# Patient Record
Sex: MALE | Race: WHITE | NOT HISPANIC OR LATINO | Employment: OTHER | URBAN - METROPOLITAN AREA
[De-identification: names, ages, dates, MRNs, and addresses within clinical notes are randomized per-mention and may not be internally consistent; named-entity substitution may affect disease eponyms.]

---

## 2022-03-10 ENCOUNTER — HISTORICAL (OUTPATIENT)
Dept: LAB | Facility: HOSPITAL | Age: 57
End: 2022-03-10

## 2022-03-10 LAB
ABS NEUT (OLG): 3.6
ALBUMIN SERPL-MCNC: 3.8 G/DL (ref 3.5–5)
ALBUMIN/GLOB SERPL: 1.1 {RATIO} (ref 1.1–2)
ALP SERPL-CCNC: 88 U/L (ref 40–150)
ALT SERPL-CCNC: 12 U/L (ref 0–55)
APPEARANCE, UA: CLEAR
AST SERPL-CCNC: 12 U/L (ref 5–34)
BACTERIA SPEC CULT: NORMAL
BASOPHILS # BLD AUTO: 0.04 10*3/UL
BASOPHILS NFR BLD AUTO: 0.5 %
BILIRUB SERPL-MCNC: 0.7 MG/DL
BILIRUB UR QL STRIP: NEGATIVE
BILIRUBIN DIRECT+TOT PNL SERPL-MCNC: 0.3 (ref 0–0.5)
BILIRUBIN DIRECT+TOT PNL SERPL-MCNC: 0.4
BUN SERPL-MCNC: 13 MG/DL (ref 8.4–25.7)
CALCIUM SERPL-MCNC: 9 MG/DL (ref 8.7–10.5)
CHLORIDE SERPL-SCNC: 104 MMOL/L (ref 98–107)
CO2 SERPL-SCNC: 28 MMOL/L (ref 22–29)
COLOR UR: YELLOW
CREAT SERPL-MCNC: 0.94 MG/DL (ref 0.73–1.18)
EOSINOPHIL # BLD AUTO: 0.08 10*3/UL
EOSINOPHIL NFR BLD AUTO: 1.1 %
ERYTHROCYTE [DISTWIDTH] IN BLOOD BY AUTOMATED COUNT: 14 %
GLOBULIN SER-MCNC: 3.5 G/DL (ref 2.4–3.5)
GLUCOSE (UA): NEGATIVE
GLUCOSE SERPL-MCNC: 111 MG/DL (ref 74–100)
HCT VFR BLD AUTO: 45.6 % (ref 39–49)
HEMOLYSIS INTERF INDEX SERPL-ACNC: 7
HGB BLD-MCNC: 15.4 G/DL (ref 12.6–16.6)
HGB UR QL STRIP: NEGATIVE
ICTERIC INTERF INDEX SERPL-ACNC: 1
IMM GRANULOCYTES # BLD AUTO: 0.02 10*3/UL (ref 0–0.1)
IMM GRANULOCYTES NFR BLD AUTO: 0.3 % (ref 0–1)
KETONES UR QL STRIP: NEGATIVE
LEUKOCYTE ESTERASE UR QL STRIP: NEGATIVE
LIPEMIC INTERF INDEX SERPL-ACNC: 0
LYMPHOCYTES # BLD AUTO: 3.04 10*3/UL
LYMPHOCYTES NFR BLD AUTO: 40.7 %
MANUAL DIFF? (OHS): NO
MCH RBC QN AUTO: 29.8 PG (ref 27–33)
MCHC RBC AUTO-ENTMCNC: 33.8 G/DL (ref 32–35)
MCV RBC AUTO: 88.4 FL (ref 84–97)
MONOCYTES # BLD AUTO: 0.69 10*3/UL
MONOCYTES NFR BLD AUTO: 9.2 %
NEUTROPHILS # BLD AUTO: 3.6 10*3/UL
NEUTROPHILS NFR BLD AUTO: 48.2 %
NITRITE UR QL STRIP: NEGATIVE
PH UR STRIP: 6.5 [PH] (ref 4.6–8)
PLATELET # BLD AUTO: 314 10*3/UL (ref 140–450)
PMV BLD AUTO: 9 FL
POTASSIUM SERPL-SCNC: 4.2 MMOL/L (ref 3.5–5.1)
PROT SERPL-MCNC: 7.3 G/DL (ref 6.4–8.3)
PROT UR QL STRIP: NEGATIVE
PSA SERPL-MCNC: 0.35 NG/ML
RBC # BLD AUTO: 5.16 10*6/UL (ref 4.3–5.6)
RBC #/AREA URNS HPF: NORMAL /[HPF]
SODIUM SERPL-SCNC: 141 MMOL/L (ref 136–145)
SP GR UR STRIP: <=1.005 (ref 1–1.03)
SQUAMOUS EPITHELIAL, UA: NORMAL
TSH SERPL-ACNC: 0.6 M[IU]/L (ref 0.35–4.94)
UROBILINOGEN UR STRIP-ACNC: 0.2
WBC # SPEC AUTO: 7.47 10*3/UL (ref 3.4–9.2)
WBC #/AREA URNS HPF: NORMAL /[HPF] (ref 0–3)

## 2022-04-28 ENCOUNTER — HISTORICAL (OUTPATIENT)
Dept: LAB | Facility: HOSPITAL | Age: 57
End: 2022-04-28
Payer: MEDICAID

## 2022-04-28 LAB
BUN SERPL-MCNC: 7 MG/DL (ref 8.4–25.7)
CALCIUM SERPL-MCNC: 9 MG/DL (ref 8.7–10.5)
CHLORIDE SERPL-SCNC: 105 MMOL/L (ref 98–107)
CO2 SERPL-SCNC: 27 MMOL/L (ref 22–29)
CREAT SERPL-MCNC: 1.01 MG/DL (ref 0.73–1.18)
CREAT/UREA NIT SERPL: 7
GLUCOSE SERPL-MCNC: 127 MG/DL (ref 74–100)
HEMOLYSIS INTERF INDEX SERPL-ACNC: 9
ICTERIC INTERF INDEX SERPL-ACNC: 1
LIPEMIC INTERF INDEX SERPL-ACNC: 5
POTASSIUM SERPL-SCNC: 4.4 MMOL/L (ref 3.5–5.1)
SODIUM SERPL-SCNC: 142 MMOL/L (ref 136–145)

## 2022-04-29 ENCOUNTER — HISTORICAL (OUTPATIENT)
Dept: RADIOLOGY | Facility: HOSPITAL | Age: 57
End: 2022-04-29
Payer: MEDICAID

## 2022-08-16 NOTE — PROGRESS NOTES
Patient ID: [id number]     Chief Complaint: Follow-up (Pt states since cardio (LETICIA Faust, GORDON) put him on amlodipine 5mg he started having severe headaches so he D'C'd all medications. Has not taken any in approx 1 week.)        HPI:     Antonio Thompson is a 56 y.o. male here today with high bp, states he quit taking all BP meds about a week ago which included amlodipine and losartaan  States he has been off of propranolol for months  Reports ongoing headache x weeks on and off  Denies visual changes, slurred speech, extremity weakness, memory loss     States he quit taking meds bc amlodipine caused headache after starting    Not taking anything for headaches  Drinks a lot of dr pepper daily  Denies drug and alcohol use      He is smoking 1/2 ppd now, but used to smoke 1.5 ppd since the age of 14. He does not drink alcohol, but used to.          ----------------------------  Cervical spondylosis  Chronic low back pain  COPD (chronic obstructive pulmonary disease)  HTN (hypertension)  Hyperlipidemia  Migraine  Palpitations     Past Surgical History:   Procedure Laterality Date    MANDIBLE SURGERY  1992       Review of patient's allergies indicates:  No Known Allergies    Outpatient Medications Marked as Taking for the 8/18/22 encounter (Office Visit) with NURSE PRACTITIONER, [facility]   Medication Sig Dispense Refill    albuterol (PROVENTIL/VENTOLIN HFA) 90 mcg/actuation inhaler Inhale into the lungs.         Social History     Socioeconomic History    Marital status:    Tobacco Use    Smoking status: Current Every Day Smoker     Packs/day: 1.00     Types: Cigarettes    Smokeless tobacco: Never Used   Substance and Sexual Activity    Alcohol use: Not Currently    Drug use: Not Currently     Types: Marijuana    Sexual activity: Yes        Family History   Problem Relation Age of Onset    Heart attack Father 54        Cause of death    Coronary artery disease Father     Hypertension Father      "Hyperlipidemia Father     Hypertension Brother         Subjective:     ROS      See HPI for details    Constitutional: Denies Change in appetite. Denies Chills. Denies Fever. Denies Night sweats.  Eye: Denies Blurred vision. Denies Discharge. Denies Eye pain.  ENT: Denies Decreased hearing. Denies Sore throat. Denies Swollen glands.  Respiratory: Denies Cough. Denies Shortness of breath. Denies Shortness of breath with exertion. Denies Wheezing.  Cardiovascular: Denies Chest pain at rest. Denies Chest pain with exertion. Denies Irregular heartbeat. Denies Palpitations.  Neurologic: Denies Dizziness. Denies Fainting. + Headache.      All Other ROS: Negative except as stated in HPI.       Objective:     BP (!) 199/86   Pulse (!) 58   Temp 97.5 °F (36.4 °C) (Tympanic)   Resp 18   Ht 5' 3.78" (1.62 m)   Wt 57.3 kg (126 lb 6.4 oz)   SpO2 99%   BMI 21.85 kg/m²     Physical Exam    General: Alert and oriented, No acute distress.  Head: Normocephalic, Atraumatic.  Eye: Pupils are equal, round and reactive to light, Extraocular movements are intact, Sclera non-icteric.  Neck/Thyroid: Supple,   Respiratory: Clear to auscultation bilaterally; No wheezes, rales or rhonchi,Non-labored respirations, Symmetrical chest wall expansion.  Cardiovascular: Regular rate and rhythm, S1/S2 normal, No murmurs, rubs or gallops.  Musculoskeletal: Normal range of motion.  Integumentary: Warm, Dry, Intact, No suspicious lesions or rashes.  Extremities: No clubbing, cyanosis or edema  Neurologic: No focal deficits, .  Psychiatric: Normal interaction, Appropriate affect         Assessment:       ICD-10-CM ICD-9-CM   1. Uncontrolled hypertension  I10 401.9   2. Nonadherence to medication  Z91.14 V15.81        Plan:     1. Uncontrolled hypertension    2. Nonadherence to medication    Start benicar 40/25, clonidine prn systolic >180  Call with bp tomorrow  F/u next week for revisit                 Follow up in about 1 week (around " 8/25/2022), or with bp log.

## 2022-08-18 ENCOUNTER — OFFICE VISIT (OUTPATIENT)
Dept: FAMILY MEDICINE | Facility: CLINIC | Age: 57
End: 2022-08-18
Payer: MEDICAID

## 2022-08-18 VITALS
RESPIRATION RATE: 18 BRPM | DIASTOLIC BLOOD PRESSURE: 86 MMHG | HEIGHT: 64 IN | HEART RATE: 58 BPM | SYSTOLIC BLOOD PRESSURE: 199 MMHG | WEIGHT: 126.38 LBS | TEMPERATURE: 98 F | OXYGEN SATURATION: 99 % | BODY MASS INDEX: 21.57 KG/M2

## 2022-08-18 DIAGNOSIS — I10 UNCONTROLLED HYPERTENSION: Primary | ICD-10-CM

## 2022-08-18 DIAGNOSIS — Z91.148 NONADHERENCE TO MEDICATION: ICD-10-CM

## 2022-08-18 PROCEDURE — 99214 PR OFFICE/OUTPT VISIT, EST, LEVL IV, 30-39 MIN: ICD-10-PCS | Mod: ,,, | Performed by: NURSE PRACTITIONER

## 2022-08-18 PROCEDURE — 3077F SYST BP >= 140 MM HG: CPT | Mod: CPTII,,, | Performed by: NURSE PRACTITIONER

## 2022-08-18 PROCEDURE — 3079F DIAST BP 80-89 MM HG: CPT | Mod: CPTII,,, | Performed by: NURSE PRACTITIONER

## 2022-08-18 PROCEDURE — 3008F PR BODY MASS INDEX (BMI) DOCUMENTED: ICD-10-PCS | Mod: CPTII,,, | Performed by: NURSE PRACTITIONER

## 2022-08-18 PROCEDURE — 1159F MED LIST DOCD IN RCRD: CPT | Mod: CPTII,,, | Performed by: NURSE PRACTITIONER

## 2022-08-18 PROCEDURE — 99214 OFFICE O/P EST MOD 30 MIN: CPT | Mod: ,,, | Performed by: NURSE PRACTITIONER

## 2022-08-18 PROCEDURE — 4010F ACE/ARB THERAPY RXD/TAKEN: CPT | Mod: CPTII,,, | Performed by: NURSE PRACTITIONER

## 2022-08-18 PROCEDURE — 3079F PR MOST RECENT DIASTOLIC BLOOD PRESSURE 80-89 MM HG: ICD-10-PCS | Mod: CPTII,,, | Performed by: NURSE PRACTITIONER

## 2022-08-18 PROCEDURE — 3077F PR MOST RECENT SYSTOLIC BLOOD PRESSURE >= 140 MM HG: ICD-10-PCS | Mod: CPTII,,, | Performed by: NURSE PRACTITIONER

## 2022-08-18 PROCEDURE — 1159F PR MEDICATION LIST DOCUMENTED IN MEDICAL RECORD: ICD-10-PCS | Mod: CPTII,,, | Performed by: NURSE PRACTITIONER

## 2022-08-18 PROCEDURE — 3008F BODY MASS INDEX DOCD: CPT | Mod: CPTII,,, | Performed by: NURSE PRACTITIONER

## 2022-08-18 PROCEDURE — 4010F PR ACE/ARB THEARPY RXD/TAKEN: ICD-10-PCS | Mod: CPTII,,, | Performed by: NURSE PRACTITIONER

## 2022-08-18 PROCEDURE — 1160F RVW MEDS BY RX/DR IN RCRD: CPT | Mod: CPTII,,, | Performed by: NURSE PRACTITIONER

## 2022-08-18 PROCEDURE — 1160F PR REVIEW ALL MEDS BY PRESCRIBER/CLIN PHARMACIST DOCUMENTED: ICD-10-PCS | Mod: CPTII,,, | Performed by: NURSE PRACTITIONER

## 2022-08-18 RX ORDER — PROPRANOLOL HYDROCHLORIDE 20 MG/1
20 TABLET ORAL 2 TIMES DAILY
COMMUNITY
Start: 2022-05-02 | End: 2022-08-18

## 2022-08-18 RX ORDER — PRAVASTATIN SODIUM 40 MG/1
40 TABLET ORAL DAILY
COMMUNITY
Start: 2022-05-02

## 2022-08-18 RX ORDER — ALBUTEROL SULFATE 90 UG/1
AEROSOL, METERED RESPIRATORY (INHALATION)
COMMUNITY
Start: 2022-04-28

## 2022-08-18 RX ORDER — LOSARTAN POTASSIUM 25 MG/1
25 TABLET ORAL DAILY
COMMUNITY
Start: 2022-05-16 | End: 2022-08-18

## 2022-08-18 RX ORDER — CLONIDINE HYDROCHLORIDE 0.1 MG/1
0.1 TABLET ORAL 3 TIMES DAILY PRN
Qty: 60 TABLET | Refills: 0 | Status: SHIPPED | OUTPATIENT
Start: 2022-08-18 | End: 2023-08-18

## 2022-08-18 RX ORDER — AMLODIPINE BESYLATE 5 MG/1
5 TABLET ORAL DAILY
COMMUNITY
Start: 2022-05-16 | End: 2022-08-18 | Stop reason: SINTOL

## 2022-08-18 RX ORDER — AMITRIPTYLINE HYDROCHLORIDE 25 MG/1
25 TABLET, FILM COATED ORAL NIGHTLY
COMMUNITY
Start: 2022-05-03

## 2022-08-18 RX ORDER — OLMESARTAN MEDOXOMIL AND HYDROCHLOROTHIAZIDE 40/25 40; 25 MG/1; MG/1
1 TABLET ORAL DAILY
Qty: 90 TABLET | Refills: 3 | Status: SHIPPED | OUTPATIENT
Start: 2022-08-18 | End: 2023-08-18

## 2022-09-28 DIAGNOSIS — I10 UNCONTROLLED HYPERTENSION: Primary | ICD-10-CM

## 2022-09-28 DIAGNOSIS — E78.5 HYPERLIPIDEMIA, UNSPECIFIED HYPERLIPIDEMIA TYPE: ICD-10-CM

## 2022-09-28 DIAGNOSIS — R73.01 ELEVATED FASTING GLUCOSE: ICD-10-CM

## 2024-10-21 DIAGNOSIS — R22.1 LOCALIZED SWELLING, MASS AND LUMP, NECK: Primary | ICD-10-CM

## 2024-10-24 ENCOUNTER — HOSPITAL ENCOUNTER (OUTPATIENT)
Dept: RADIOLOGY | Facility: HOSPITAL | Age: 59
Discharge: HOME | End: 2024-10-24
Attending: OTOLARYNGOLOGY
Payer: MEDICARE

## 2024-10-24 DIAGNOSIS — R22.1 LOCALIZED SWELLING, MASS AND LUMP, NECK: ICD-10-CM

## 2024-10-24 PROCEDURE — 70492 CT SFT TSUE NCK W/O & W/DYE: CPT | Mod: TC

## 2024-10-24 PROCEDURE — 25500020 PHARM REV CODE 255: Performed by: OTOLARYNGOLOGY

## 2024-10-24 RX ORDER — IOPAMIDOL 755 MG/ML
100 INJECTION, SOLUTION INTRAVASCULAR
Status: COMPLETED | OUTPATIENT
Start: 2024-10-24 | End: 2024-10-24

## 2024-10-24 RX ADMIN — IOPAMIDOL 100 ML: 755 INJECTION, SOLUTION INTRAVENOUS at 10:10

## 2024-11-08 ENCOUNTER — HOSPITAL ENCOUNTER (OUTPATIENT)
Dept: RADIOLOGY | Facility: HOSPITAL | Age: 59
Discharge: HOME | End: 2024-11-08
Attending: OTOLARYNGOLOGY
Payer: MEDICARE

## 2024-11-08 DIAGNOSIS — K11.8 MASS OF LEFT PAROTID GLAND: ICD-10-CM

## 2024-11-08 DIAGNOSIS — K11.6 CYST OF LEFT PAROTID GLAND: Primary | Chronic | ICD-10-CM

## 2024-11-08 LAB
BASOPHILS # BLD AUTO: 0.09 X10(3)/MCL
BASOPHILS NFR BLD AUTO: 0.7 %
EOSINOPHIL # BLD AUTO: 0.22 X10(3)/MCL (ref 0–0.9)
EOSINOPHIL NFR BLD AUTO: 1.8 %
ERYTHROCYTE [DISTWIDTH] IN BLOOD BY AUTOMATED COUNT: 14.5 % (ref 11.5–17)
HCT VFR BLD AUTO: 46.9 % (ref 42–52)
HGB BLD-MCNC: 15.7 G/DL (ref 14–18)
IMM GRANULOCYTES # BLD AUTO: 0.04 X10(3)/MCL (ref 0–0.04)
IMM GRANULOCYTES NFR BLD AUTO: 0.3 %
INR PPP: 1.2
LYMPHOCYTES # BLD AUTO: 3.49 X10(3)/MCL (ref 0.6–4.6)
LYMPHOCYTES NFR BLD AUTO: 28.9 %
MCH RBC QN AUTO: 29.8 PG (ref 27–31)
MCHC RBC AUTO-ENTMCNC: 33.5 G/DL (ref 33–36)
MCV RBC AUTO: 89.2 FL (ref 80–94)
MONOCYTES # BLD AUTO: 0.6 X10(3)/MCL (ref 0.1–1.3)
MONOCYTES NFR BLD AUTO: 5 %
NEUTROPHILS # BLD AUTO: 7.62 X10(3)/MCL (ref 2.1–9.2)
NEUTROPHILS NFR BLD AUTO: 63.3 %
NRBC BLD AUTO-RTO: 0 %
PLATELET # BLD AUTO: 279 X10(3)/MCL (ref 130–400)
PMV BLD AUTO: 9.1 FL (ref 7.4–10.4)
PROTHROMBIN TIME: 15 SECONDS (ref 12.5–14.5)
RBC # BLD AUTO: 5.26 X10(6)/MCL (ref 4.7–6.1)
WBC # BLD AUTO: 12.06 X10(3)/MCL (ref 4.5–11.5)

## 2024-11-08 PROCEDURE — 88112 CYTOPATH CELL ENHANCE TECH: CPT

## 2024-11-08 PROCEDURE — 85610 PROTHROMBIN TIME: CPT | Performed by: OTOLARYNGOLOGY

## 2024-11-08 PROCEDURE — 88305 TISSUE EXAM BY PATHOLOGIST: CPT | Performed by: OTOLARYNGOLOGY

## 2024-11-08 PROCEDURE — 36415 COLL VENOUS BLD VENIPUNCTURE: CPT | Performed by: OTOLARYNGOLOGY

## 2024-11-08 PROCEDURE — 10005 FNA BX W/US GDN 1ST LES: CPT

## 2024-11-08 PROCEDURE — 85025 COMPLETE CBC W/AUTO DIFF WBC: CPT | Performed by: OTOLARYNGOLOGY

## 2024-11-08 NOTE — H&P
IR Pre Procedure Note   Subjective:      Antonio Thompson is a 58 y.o. male who presents today with Left Parotid Nodule. This consultation is requested for the specific conditions prompting preoperative evaluation for: Left Parotid Nodule Biopsy.     Planned anesthesia: IV sedation.     The following portions of the patient's history were reviewed and updated as appropriate: allergies, current medications, past family history, past medical history, past social history, past surgical history, and problem list.    Review of Systems:  Pertinent items are noted in HPI.     Past Medical History:  Past Medical History:   Diagnosis Date    Cervical spondylosis     Chronic low back pain     COPD (chronic obstructive pulmonary disease)     HTN (hypertension)     Hyperlipidemia     Migraine     Palpitations         Social History:  Social History     Socioeconomic History    Marital status:    Tobacco Use    Smoking status: Every Day     Current packs/day: 1.00     Types: Cigarettes    Smokeless tobacco: Never   Substance and Sexual Activity    Alcohol use: Not Currently    Drug use: Not Currently     Types: Marijuana    Sexual activity: Yes     Social Drivers of Health     Food Insecurity: No Food Insecurity (10/1/2024)    Received from [facility] and Its Subsidiaries and Affiliates    Hunger Vital Sign     Worried About Running Out of Food in the Last Year: Never true     Ran Out of Food in the Last Year: Never true   Transportation Needs: No Transportation Needs (10/1/2024)    Received from [facility] and Its Subsidiaries and Affiliates    PRAPARE - Transportation     Lack of Transportation (Medical): No     Lack of Transportation (Non-Medical): No   Housing Stability: Unknown (10/1/2024)    [facility]    Housing Stability Vital Sign     Unable to Pay for  Housing in the Last Year: No     Homeless in the Last Year: No        Medication History:  Current Outpatient Medications on File Prior to Encounter    Order #: [id number]: Historical Med    Order #: [id number]: Historical Med    Order #: [id number]: Normal    Order #: [id number]: Normal    Order #: [id number]: Historical Med        Allergies:  Review of patient's allergies indicates:  No Known Allergies      Objective:     Physical Exam:  Vital Signs: BP: ()/()   Arterial Line BP: ()/()   General: No acute distress; awake, alert, and oriented x 3  HEENT: Normocephalic, atraumatic, extraocular movements intact  Cardiac:Positive S1, S2  Respiratory: Unlabored respirations  ABD: Nontender, soft  Neurological: Grossly intact; moves all 4 extremities without difficulty  Ext: No cyanosis, clubbing, or edema    Predictors of intubation difficulty:       Imaging  The pertinent imaging was personally reviewed for procedural planning, safety, and feasibility by Dr Oumar Hartman.    Lab Review   Lab Results   Component Value Date     (L) 10/02/2024    K 3.8 10/02/2024     10/02/2024    CO2 24 10/02/2024    BUN 12 10/02/2024    CREATININE 0.95 10/02/2024    GLUCOSE 127 04/28/2022    CALCIUM 8.7 (L) 10/02/2024     Lab Results   Component Value Date    WBC 12.06 (H) 11/08/2024    HGB 15.7 11/08/2024    HCT 46.9 11/08/2024    MCV 89.2 11/08/2024     11/08/2024         Assessment:        58 y.o. male presenting with Left Parotid Nodule.  The patient is being evaluated for Left Parotid Nodule Biopsy.         Plan:        Proceed with biopsy.

## 2024-11-08 NOTE — DISCHARGE SUMMARY
Radiology Post-Procedure Note    Pre Op Diagnosis: Cyst of left parotid gland     Post Op Diagnosis: Same    Secondary Diagnoses:   Problem List Items Addressed This Visit       * (Principal) Cyst of left parotid gland - Primary (Chronic)     Other Visit Diagnoses       Mass of left parotid gland        Relevant Orders    US FNA Biopsy w/ Imaging 1st Lesion             Procedure: US Guided Left Parotid Cyst Aspiration    Procedure performed by: Oumar Hartman MD    Assistant: None    Written Informed Consent Obtained: Yes    Specimen Removed: 3 cc Yellow fluid    Estimated Blood Loss: <10 cc    Condition: Stable    Outcome: The patient tolerated the procedure well and was without complications.    For further details please see the imaging report associated.    Disposition: Home or Self Care    Follow Up: With primary care provider    Discharge Instructions:  No discharge procedures on file.       Time Spent On Discharge: 2 minutes

## 2024-11-11 LAB — PSYCHE PATHOLOGY RESULT: NORMAL

## 2024-12-10 ENCOUNTER — OFFICE VISIT (OUTPATIENT)
Dept: FAMILY MEDICINE | Facility: CLINIC | Age: 59
End: 2024-12-10
Payer: MEDICARE

## 2024-12-10 VITALS
SYSTOLIC BLOOD PRESSURE: 154 MMHG | BODY MASS INDEX: 19.94 KG/M2 | HEART RATE: 51 BPM | WEIGHT: 116.81 LBS | RESPIRATION RATE: 19 BRPM | HEIGHT: 64 IN | OXYGEN SATURATION: 98 % | TEMPERATURE: 98 F | DIASTOLIC BLOOD PRESSURE: 80 MMHG

## 2024-12-10 DIAGNOSIS — I25.2 HISTORY OF NON-ST ELEVATION MYOCARDIAL INFARCTION (NSTEMI): ICD-10-CM

## 2024-12-10 DIAGNOSIS — I63.81 LACUNAR INFARCTION: Primary | ICD-10-CM

## 2024-12-10 DIAGNOSIS — Z91.89 AT RISK FOR KNOWLEDGE DEFICIT: ICD-10-CM

## 2024-12-10 DIAGNOSIS — I10 PRIMARY HYPERTENSION: ICD-10-CM

## 2024-12-10 PROBLEM — I65.29 CAROTID STENOSIS: Status: ACTIVE | Noted: 2024-10-02

## 2024-12-10 PROBLEM — Z72.0 TOBACCO USER: Status: ACTIVE | Noted: 2024-10-01

## 2024-12-10 PROBLEM — R91.1 LUNG NODULE: Status: ACTIVE | Noted: 2024-10-02

## 2024-12-10 RX ORDER — CLONIDINE HYDROCHLORIDE 0.1 MG/1
0.1 TABLET ORAL ONCE
Status: COMPLETED | OUTPATIENT
Start: 2024-12-10 | End: 2024-12-10

## 2024-12-10 RX ORDER — OLMESARTAN MEDOXOMIL AND HYDROCHLOROTHIAZIDE 40/25 40; 25 MG/1; MG/1
1 TABLET ORAL DAILY
Qty: 90 TABLET | Refills: 3 | Status: SHIPPED | OUTPATIENT
Start: 2024-12-10 | End: 2024-12-10

## 2024-12-10 RX ORDER — CLOPIDOGREL BISULFATE 75 MG/1
75 TABLET ORAL DAILY
COMMUNITY
End: 2024-12-10 | Stop reason: SDUPTHER

## 2024-12-10 RX ORDER — CLOPIDOGREL BISULFATE 75 MG/1
75 TABLET ORAL DAILY
Qty: 90 TABLET | Refills: 1 | Status: SHIPPED | OUTPATIENT
Start: 2024-12-10 | End: 2025-06-08

## 2024-12-10 RX ORDER — APIXABAN 5 MG/1
5 TABLET, FILM COATED ORAL 2 TIMES DAILY
COMMUNITY
Start: 2024-11-01

## 2024-12-10 RX ORDER — AMLODIPINE BESYLATE 5 MG/1
5 TABLET ORAL DAILY
COMMUNITY
End: 2024-12-10 | Stop reason: SDUPTHER

## 2024-12-10 RX ORDER — AMLODIPINE BESYLATE 5 MG/1
5 TABLET ORAL DAILY
Qty: 90 TABLET | Refills: 1 | Status: SHIPPED | OUTPATIENT
Start: 2024-12-10 | End: 2025-06-08

## 2024-12-10 RX ORDER — ATORVASTATIN CALCIUM 80 MG/1
80 TABLET, FILM COATED ORAL NIGHTLY
COMMUNITY
Start: 2024-10-04 | End: 2024-12-10 | Stop reason: SDUPTHER

## 2024-12-10 RX ORDER — ATORVASTATIN CALCIUM 80 MG/1
80 TABLET, FILM COATED ORAL NIGHTLY
Qty: 90 TABLET | Refills: 1 | Status: SHIPPED | OUTPATIENT
Start: 2024-12-10 | End: 2025-06-08

## 2024-12-10 RX ADMIN — CLONIDINE HYDROCHLORIDE 0.1 MG: 0.1 TABLET ORAL at 10:12

## 2024-12-10 NOTE — PROGRESS NOTES
Patient ID: [id number]     Chief Complaint: Establish Care    HPI:     Antonio Thompson is a 59 y.o. male here today for [facility] Care. Patient had an NSTEMI and lucanar infarct in September 2024. He ran out of medication that was prescribed to him at [facility] 4 days ago.     Patient states he has been seeing specialists since his discharge from the hospital but does not remember their names. He states one of them is in [place] and is a heart doctor but when asked a few minutes earlier he stated he had no heart doctor.       -------------------------------------    Cervical spondylosis    Chronic low back pain    COPD (chronic obstructive pulmonary disease)    HTN (hypertension)    Hyperlipidemia    Migraine    Palpitations        Past Surgical History:   Procedure Laterality Date    MANDIBLE SURGERY  1992       Review of patient's allergies indicates:  No Known Allergies    Outpatient Medications Marked as Taking for the 12/10/24 encounter (Office Visit) with Mathieu Vides DO   Medication Sig Dispense Refill    ELIQUIS 5 mg Tab Take 5 mg by mouth 2 (two) times daily.      [DISCONTINUED] amLODIPine (NORVASC) 5 MG tablet Take 5 mg by mouth once daily.      [DISCONTINUED] atorvastatin (LIPITOR) 80 MG tablet Take 80 mg by mouth every evening.      [DISCONTINUED] clopidogreL (PLAVIX) 75 mg tablet Take 75 mg by mouth once daily.       Current Facility-Administered Medications for the 12/10/24 encounter (Office Visit) with Mathieu Vides DO   Medication Dose Route Frequency Provider Last Rate Last Admin    cloNIDine tablet 0.1 mg  0.1 mg Oral Once            Social History     Socioeconomic History    Marital status:    Tobacco Use    Smoking status: Every Day     Current packs/day: 1.00     Types: Cigarettes    Smokeless tobacco: Never   Substance and Sexual Activity    Alcohol use: Not Currently    Drug use: Not Currently     Types: Marijuana    Sexual activity: Yes     Social Drivers of Health  "    Food Insecurity: No Food Insecurity (10/1/2024)    [facility] and Its Subsidiaries and Affiliates    Hunger Vital Sign     Worried About Running Out of Food in the Last Year: Never true     Ran Out of Food in the Last Year: Never true   Transportation Needs: No Transportation Needs (10/1/2024)    [facility]    PRAPARE - Transportation     Lack of Transportation (Medical): No     Lack of Transportation (Non-Medical): No   Housing Stability: Unknown (10/1/2024)    [facility]    Housing Stability Vital Sign     Unable to Pay for Housing in the Last Year: No     Homeless in the Last Year: No        Family History   Problem Relation Name Age of Onset    Heart attack Father  54        Cause of death    Coronary artery disease Father      Hypertension Father      Hyperlipidemia Father      Hypertension Brother          Patient Care Team:  Mathieu Vides DO as PCP - General (Family Medicine)  Mathieu Vides DO as PCP - Family Medicine (Family Medicine)     Subjective:     Review of Systems   Constitutional:  Negative for chills and fever.   Respiratory:  Negative for shortness of breath.    Cardiovascular:  Negative for chest pain.   Gastrointestinal:  Negative for constipation and diarrhea.   Neurological:  Negative for dizziness and headaches.   Psychiatric/Behavioral:  The patient does not have insomnia.        See HPI for details  All Other ROS: Negative except as stated in HPI.       Objective:     BP (!) 158/76 (BP Location: Right arm, Patient Position: Sitting)   Pulse (!) 51   Temp 97.8 °F (36.6 °C)   Resp 19   Ht 5' 4" (1.626 m)   Wt 53 kg (116 lb 12.8 oz)   SpO2 98%   BMI 20.05 kg/m²     Physical Exam  Vitals reviewed.   Constitutional:       General: He is not in " acute distress.     Appearance: Normal appearance. He is not ill-appearing.   Cardiovascular:      Rate and Rhythm: Normal rate and regular rhythm.      Pulses: Normal pulses.      Heart sounds: Normal heart sounds. No murmur heard.     No friction rub. No gallop.   Pulmonary:      Effort: No respiratory distress.      Breath sounds: No wheezing, rhonchi or rales.   Musculoskeletal:         General: No swelling.      Right lower leg: No edema.      Left lower leg: No edema.   Skin:     General: Skin is warm and dry.   Neurological:      General: No focal deficit present.      Mental Status: He is alert.   Psychiatric:         Mood and Affect: Mood normal.         Behavior: Behavior normal.         Assessment/Plan:     1. Lacunar infarction  -     atorvastatin (LIPITOR) 80 MG tablet; Take 1 tablet (80 mg total) by mouth every evening.  Dispense: 90 tablet; Refill: 1  -     clopidogreL (PLAVIX) 75 mg tablet; Take 1 tablet (75 mg total) by mouth once daily.  Dispense: 90 tablet; Refill: 1    2. History of non-ST elevation myocardial infarction (NSTEMI)  -     atorvastatin (LIPITOR) 80 MG tablet; Take 1 tablet (80 mg total) by mouth every evening.  Dispense: 90 tablet; Refill: 1  -     clopidogreL (PLAVIX) 75 mg tablet; Take 1 tablet (75 mg total) by mouth once daily.  Dispense: 90 tablet; Refill: 1    3. Primary hypertension  -     amLODIPine (NORVASC) 5 MG tablet; Take 1 tablet (5 mg total) by mouth once daily.  Dispense: 90 tablet; Refill: 1    -     cloNIDine tablet 0.1 mg administered in clinic at 09:38.     4. At risk for knowledge deficit        Unsure if patient should be on both Eliquis and Plavix. Based on one of his discharge papers, the Eliquis was prescribed for 60 days only but the Plavix for prescribed for 180 days at least.   Follow up:     Follow up in about 4 weeks (around 1/7/2025) for Medicare Wellness. In addition to their scheduled follow up, the patient has also been instructed to follow up on as  needed basis.

## 2025-01-08 ENCOUNTER — DOCUMENTATION ONLY (OUTPATIENT)
Dept: FAMILY MEDICINE | Facility: CLINIC | Age: 60
End: 2025-01-08

## 2025-01-08 ENCOUNTER — OFFICE VISIT (OUTPATIENT)
Dept: FAMILY MEDICINE | Facility: CLINIC | Age: 60
End: 2025-01-08
Payer: MEDICARE

## 2025-01-08 VITALS
DIASTOLIC BLOOD PRESSURE: 77 MMHG | HEIGHT: 64 IN | HEART RATE: 74 BPM | SYSTOLIC BLOOD PRESSURE: 150 MMHG | WEIGHT: 113.38 LBS | OXYGEN SATURATION: 98 % | TEMPERATURE: 98 F | RESPIRATION RATE: 20 BRPM | BODY MASS INDEX: 19.36 KG/M2

## 2025-01-08 DIAGNOSIS — F17.210 NICOTINE DEPENDENCE, CIGARETTES, UNCOMPLICATED: ICD-10-CM

## 2025-01-08 DIAGNOSIS — Z86.73 HISTORY OF LACUNAR CEREBROVASCULAR ACCIDENT (CVA): ICD-10-CM

## 2025-01-08 DIAGNOSIS — Z12.11 COLON CANCER SCREENING: ICD-10-CM

## 2025-01-08 DIAGNOSIS — Z00.00 MEDICARE ANNUAL WELLNESS VISIT, INITIAL: Primary | ICD-10-CM

## 2025-01-08 DIAGNOSIS — R73.01 ELEVATED FASTING BLOOD SUGAR: ICD-10-CM

## 2025-01-08 DIAGNOSIS — I10 PRIMARY HYPERTENSION: ICD-10-CM

## 2025-01-08 DIAGNOSIS — I25.2 HISTORY OF NON-ST ELEVATION MYOCARDIAL INFARCTION (NSTEMI): ICD-10-CM

## 2025-01-08 DIAGNOSIS — Z91.89 AT RISK FOR KNOWLEDGE DEFICIT: ICD-10-CM

## 2025-01-08 DIAGNOSIS — R94.6 ABNORMAL RESULTS OF THYROID FUNCTION STUDIES: ICD-10-CM

## 2025-01-08 DIAGNOSIS — Z12.5 SCREENING PSA (PROSTATE SPECIFIC ANTIGEN): ICD-10-CM

## 2025-01-08 DIAGNOSIS — I65.29 STENOSIS OF CAROTID ARTERY, UNSPECIFIED LATERALITY: ICD-10-CM

## 2025-01-08 DIAGNOSIS — Z78.9 POOR HISTORIAN: ICD-10-CM

## 2025-01-08 DIAGNOSIS — Z11.59 NEED FOR HEPATITIS C SCREENING TEST: ICD-10-CM

## 2025-01-08 DIAGNOSIS — Z11.4 SCREENING FOR HIV (HUMAN IMMUNODEFICIENCY VIRUS): ICD-10-CM

## 2025-01-08 PROBLEM — I63.81 LACUNAR INFARCTION: Status: RESOLVED | Noted: 2024-10-01 | Resolved: 2025-01-08

## 2025-01-08 PROBLEM — K11.9 LESION OF PAROTID GLAND: Status: ACTIVE | Noted: 2025-01-08

## 2025-01-08 RX ORDER — APIXABAN 5 MG/1
5 TABLET, FILM COATED ORAL 2 TIMES DAILY
Qty: 60 TABLET | Refills: 2 | Status: SHIPPED | OUTPATIENT
Start: 2025-01-08 | End: 2025-04-08

## 2025-01-08 RX ORDER — CLONIDINE HYDROCHLORIDE 0.1 MG/1
0.1 TABLET ORAL 3 TIMES DAILY PRN
Qty: 60 TABLET | Refills: 0 | Status: SHIPPED | OUTPATIENT
Start: 2025-01-08 | End: 2026-01-08

## 2025-01-08 RX ORDER — AMLODIPINE BESYLATE 10 MG/1
10 TABLET ORAL DAILY
Qty: 90 TABLET | Refills: 0 | Status: SHIPPED | OUTPATIENT
Start: 2025-01-08 | End: 2025-04-08

## 2025-01-08 NOTE — ASSESSMENT & PLAN NOTE
On anticoagulation + high dose statin.     Managed by Neurology- Dr. Potts.     He is scheduled to follow up in clinic February 19th.

## 2025-01-08 NOTE — ASSESSMENT & PLAN NOTE
Increase Amlodipine to 10 mg daily.   Low Sodium Diet (DASH Diet - Less than 2 grams of sodium per day).  Monitor blood pressure daily and log. Report consistent numbers greater than 140/90.  Maintain healthy weight with goal BMI <30. Exercise 30 minutes per day, 5 days per week.  Smoking cessation encouraged to aid in BP reduction.

## 2025-01-08 NOTE — PATIENT INSTRUCTIONS
Medicare Annual Wellness Visit      Patient Name: Antonio Thompson  Today's Date: 1/8/2025    Below you will find your 5-10 year Screening Plan Recommendations:  Health Maintenance         Date Due Completion Date    Hepatitis C Screening Never done ---    HIV Screening Never done ---    LDCT Lung Screen Never done ---    Colorectal Cancer Screening 05/01/2023 5/1/2022    RSV Vaccine (Age 60+ and Pregnant patients) (1 - Risk 60-74 years 1-dose series) 01/08/2025 (Originally 11/14/2023) ---    TETANUS VACCINE 01/08/2026 (Originally [birth year]/14/1983) ---    Shingles Vaccine (1 of 2) 01/08/2026 (Originally 11/14/2013) ---    COVID-19 Vaccine (1 - 2024-25 season) 01/08/2026 (Originally 9/1/2024) ---    Pneumococcal Vaccines (Age 50+) (2 of 2 - PCV) 01/08/2026 (Originally 4/6/2023) 4/6/2022    High Dose Statin 01/08/2026 1/8/2025    Lipid Panel 10/01/2029 10/1/2024            Below is your summarized Personalized Prevention Plan that addresses any concerns we discussed today at your visit. Please see attached detailed information specific to your Health Concerns.  Orders Placed This Encounter   Procedures    CT Chest Lung Screening Low Dose    HIV 1/2 Ag/Ab (4th Gen)    Hepatitis C antibody    CBC Auto Differential    Comprehensive Metabolic Panel    Lipid Panel    TSH    Hemoglobin A1C    Urinalysis    PSA, Screening    Ambulatory referral/consult to Cardiology    Ambulatory referral/consult to Gastroenterology    Ambulatory referral/consult to Smoking Cessation Program    Ambulatory referral/consult to Home Health         The following information is provided to all patients.  This information is to help you find additional resources for any problems that may be affecting your health: Living healthy guide: www.[facility].[place].gov      Understanding Diabetes: www.diabetes.org      Eating healthy: www.cdc.gov/healthyweight      CDC home safety checklist: www.cdc.gov/steadi/patient.html      Agency on Aging: www.goea.[place].gov       Alcoholics anonymous (AA): www.aa.org      Physical Activity: www.raad.nih.gov/gd0ttfw      Tobacco use: www.quitwithusla.org                                             Patient Education       Quitting Smoking for Older Adults   About this topic   Most of us know that smoking can cause problems to our health. Even if you know that it is bad, you continue smoking. It is because when you start to smoke it is hard to give up. You can become addicted to smoking.  Smoking is very harmful to your health. It can cause many illnesses and can affect how you look. The longer you smoke, the greater the effects on your health. It is never too late to try to quit.  As you stop smoking, it is normal to have signs of withdrawal. These will lessen over time. You may have signs like:  Trouble sleeping  Feeling more hungry  Weight gain  Hard stools  Dizziness  Sore throat or cough  Being irritable  Being anxious or restless  Getting frustrated or angry  Trouble thinking clearly  Low mood  Certain medicines given to you by your regular doctor can help improve these symptoms.  General   Older smokers are at high risk from smoking. The longer you have smoked, the more toxins you have been exposed to. Toxins are the bad chemicals in the tobacco. As someone who has smoked for a long time, you are more likely to have illnesses related to smoking.  It is never too late to quit smoking. It helps your health even at an older age. You will begin to notice changes soon after you stop smoking. Here are some steps you can take to help you quit smoking:  Set a date to quit smoking.  Tell your family and friends about your plan to quit smoking. Let them know how to help with your plan.  Plan ahead about what you will do instead of smoking when you crave a cigarette.  Remove cigarettes from your home, car, and work.  You may want to talk with a counselor to help you learn about:  What triggers you to smoke.  How to resist cravings.  Things to  raise your chance of quitting smoking for good.  Counseling can be in person, over the phone, in a group, online, or through text messages.  Talk with your regular doctor about medicines to help you stop smoking.  Exercise regularly. This may help to lower stress. Going for a walk is good exercise.  Keep your mouth busy with sugar-free candy or gum.  Stay away from people and places that make you want to smoke. If others close to you smoke, ask them to quit with you or to not smoke around you.     What will the results be?   When you start to quit:  Blood flow improves right away.  Your lungs become more active.  Heart rate and blood pressure lower.  You have less chance of having a heart attack.  You will help others be healthy since they are not around secondhand smoke.  After a few days to weeks:  Food tastes and smells better.  Your lungs begin to work better.  Breathing becomes easier.  After a few weeks to months:  You have more energy.  Lungs become clear and work better.  You are less likely to get colds and other lung infections.  Shortness of breath decreases.  Clogging of sinuses decreases.  When you have fully stopped smoking, after a while you will:  Lower the risk of lung cancer  Lower the risk of cancer of the mouth, esophagus, voice box, bladder, pancreas, cervix, and kidney  Lower the risk of heart illnesses like stroke and heart attack  Preserve your eyesight and the look of your teeth and aging skin  Lessen the risk for having type 2 diabetes  Reverse bone loss  Lessen the risk of postoperative problems  Lessen the risk of peptic ulcer and improve healing if ulcer is already there  Help others be healthy since they are not around secondhand smoke  Secondhand smoke:  It is important to know that smoking does not just affect you, it affects everyone around you.  It can cause cancer and heart disease in nonsmokers.  It is more dangerous for babies, children, and pregnant women.  It causes many of the  same health problems in others, including your family and friends.  The only way to stop secondhand smoke is to quit smoking. It is also important to avoid places where you and others are around people who smoke.  What lifestyle changes are needed?   Thoroughly wash and remove all ashtrays from your home and office.  Watch your eating habits and avoid gaining weight. Eat healthy foods and snacks to keep a healthy weight  Keep your mouth busy with sugar free candy and gum.  Change your daily routine. Try to change things like eat breakfast at a different place or drink tea instead of coffee.  Try to relax. Listen to music, meditate, do yoga or breathing exercises, take a relaxing bath or hot shower.  Control your thoughts and emotions. Write or record a journal, create new hobbies, and think positive.  Connect with family and friends. Talk to a friend, get a pet, share your problems with family members, and participate in your community.  What drugs may be needed?   The doctor may order drugs to:  Help with withdrawal signs  Reduce your urges to smoke  Gums, lozenges, and skin patches may be given as a substitute for tobacco.  Will there be any other care needed?   It helps to have other people to support you when you are trying to quit smoking. Talk to your family and friends about how they can best help you. You may also want to think about support groups or counseling.  When do I need to call the doctor?   You have signs of low mood or depression like:  Feeling sad, down, hopeless, or cranky most of the day, almost every day.  Losing or gaining weight without trying to do so.  Sleeping too much or too little.  Feeling tired or like you have no energy.  Acting restless or having trouble staying still.  Helpful tips   Commit yourself to stop smoking.  Focus on your goals and work to achieve them.  Talk with your doctor if you are thinking about switching to an electronic cigarette.  Where can I learn more?    American Cancer Society  https://www.cancer.org/latest-news/tkacb-pjt-wvrr-to-quit-smoking.html   American Lung Association  http://www.lung.org/stop-smoking/g-wtdo-wb-quit/   Centers for Disease Control and Prevention  http://www.cdc.gov/tobacco/campaign/tips/   Last Reviewed Date   2021-06-08  Consumer Information Use and Disclaimer   This information is not specific medical advice and does not replace information you receive from your health care provider. This is only a brief summary of general information. It does NOT include all information about conditions, illnesses, injuries, tests, procedures, treatments, therapies, discharge instructions or life-style choices that may apply to you. You must talk with your health care provider for complete information about your health and treatment options. This information should not be used to decide whether or not to accept your health care providers advice, instructions or recommendations. Only your health care provider has the knowledge and training to provide advice that is right for you.  Copyright   Copyright © 2021 [employer] and its affiliates and/or licensors. All rights reserved.    Patient Education       Smoking: Not Just Harmful to Your Lungs and Heart   About this topic   Smoking is very common at any age. It is one of the leading causes of poor health and illness. Smoking can lead to death. It has many harmful chemicals that are released by the tobacco you smoke and inhale. Tobacco has many forms. These are:  Cigarettes  Pipes  Cigars  Chewing tobacco  Electronic cigarettes  Loose  Hookah  All kinds of tobaccos contain many toxic substances. These are what make you sick from smoking.  Nicotine ? The main thing that makes you addicted to smoking  Carbon monoxide ? A poison that gets into your blood when smoking  Tar ? Has chemicals that are cancerous  Lead and many others  These factors affect how much damage smoking will have on you:  How much you  smoke  What you smoke  How what you smoke has been prepared  The content of what you smoke  Smoking hurts every part of the body. The lungs and the heart are most often affected by tobacco use.  General   Smoking is very harmful to your body. It can cause many illnesses and can affect how you look.  Smoking and Cancer   Smoking is the most common cause of lung cancer. Smoking may also increase the risk of:  Mouth cancer. This can also be caused by chewing tobacco.  Bladder cancer  Cancer of the tube from the mouth to stomach. This is also called the esophagus.  Cancer of the throat. This can also be caused by chewing tobacco.  Kidney cancer  Liver cancer  Stomach, colon, and rectal cancer  Cancer of the pancreas  Cancer of the cervix in women  Cancer of the blood or leukemia  Skin cancer  Smoking and Your Lungs   If you smoke you are more likely to have:  Breathing problems  Lung infections like pneumonia or bronchitis  Lung disease such as COPD or emphysema  Asthma  Smoking and the Heart and Circulation   Causes heart disease and stroke  Smokers are at a higher risk for high blood pressure  You are more likely to get blood clots  Smoking can lower blood flow to the legs and skin  Smoking and Diabetes   If you smoke, you:  Have a higher risk of developing diabetes  May have higher blood sugar levels  May have more problems with your diabetes  Smoking and Digestion   Smokers make more stomach acid. This can cause sores or ulcers in your belly or bowel.  Your stomach acids may flow back to your esophagus. This is also called heartburn.  You have more chance of bowel irritation and swelling  Smoking and Your Bones   If you smoke:  Your bone-forming cells don't grow as fast  Your bones may become weaker (osteoporosis)  You may have more low back pain and arthritis  You may have more overuse injuries  You may have a greater risk of breaking bones  Your broken bones may take longer to heal  Smoking and Pregnancy   Makes  your baby more prone to problems  You have a higher chance of having a premature baby or baby born early  Your healthy baby may die without reason. This is called sudden infant death syndrome.  Your baby may be born dead (stillbirth)  Your baby may have a low birth weight  You have a higher risk of an ectopic pregnancy or a pregnancy outside of the uterus  You have a higher chance of having a baby with mouth or facial defects  Smoking and Your Eyes, Hair, Hands, and Mouth   If you smoke, you may notice your:  Eyes become cloudy and form cataracts  Hair may get thin and turn gray sooner than it should  Fingernails turn yellow  Teeth become yellowish brown  Gums have more problems  Teeth have problems or even become loose  Sense of taste and smell start to go away  Breath smells bad  Smoking and Skin   Smoking causes:  Less blood flow to the skin  Wrinkles start early around your eyes and mouth  Dry skin  Your skin to lose elasticity and strength  Skin may get splotchy or pale  Your face to look thin and old. This is called smoker's face.  Greater risk of getting a skin problem called psoriasis  Slower healing of cuts or bruises  Smoking and Sex Life   If you smoke you are more likely to have problems like:  Impotence  Decreased blood flow to the penis. This is also called erectile dysfunction.  Trouble getting pregnant  Early change of life or menopause for women  A higher risk of heart attack or stroke for women who smoke and use birth control pills  Damaged sperm that may lead to problems getting a woman pregnant, birth defects, or miscarriage  Smoking and Your Brain and Behavior   Smoking can:  Affect your mood  Lead to addiction  Cause long-term confusion or damage to your brain cells  Cause low mood  Smoking and Children   If you smoke, your children:  Will be more likely to smoke.  Can be sick from being around your smoke. This is called secondhand smoke.  Need to learn about the bad effects of smoking. Most  smokers start before the age of 18. Encourage your children never to smoke.  Smoking and Other Effects   Smoking can cause:  Wounds to take longer to heal  You to eat poorly  Weight loss  Swelling in the body and affect the body's immune or defense system  Rheumatoid arthritis  Greater chance of injury  You to get warts easier (human papillomavirus)  A bad odor in hair and on clothes  You to have poor sports performance  You to spend a lot of money. Smoking is an expensive habit. A smoker who smokes a pack per day in the [place] will spend over $2000 per year on cigarettes.  Health care to cost more  You to miss work more often  Hearing loss  Even if you have smoked for many years, it is not too late to quit. Your body starts to heal as soon as you stop smoking. It is better to quit when you are young, but you can still get healthier if you quit at any age.       Helpful tips   Some helpful steps you can take to help you quit smoking:  Set a date to quit smoking.  Know the reasons that make you smoke more.  Know why you want to quit smoking.  Write down each time you smoke. Include the time and what you are doing. Plan ahead about what you will do instead of smoking when that time or event reappears.  Tell your family and friends about your plan to quit smoking. Let them know how to help you.  Slowly reduce your smoking.  Remove smoking and tobacco products from your home and other places.  Avoid places and situations where you will more likely smoke. If people close to you smoke, ask them to quit with you. If they do not quit, ask them to not smoke around you.  Reward or treat yourself every time you do not smoke. Do not use food as a reward.  Ask a doctor for help.  Talk with your doctor before you try an electronic cigarette.  Where can I learn more?   Centers for Disease Control and Prevention  http://www.cdc.gov/tobacco/data_statistics/fact_sheets/health_effects/effects_cig_smoking/#overview   Centers for Disease  Control and Prevention  https://www.cdc.gov/tobacco/campaign/tips/quit-smoking/guide/quit-plan.html?s_cid=OS_tips_D9400   KidsHealth  http://kidshealth.org/teen/drug_alcohol/tobacco/smoking.html#   [place] Food and Drug Administration  https://www.fda.gov/TobaccoProducts/Labeling/ProductsIngredientsComponents/default.htm   Last Reviewed Date   2021-03-31  Consumer Information Use and Disclaimer   This information is not specific medical advice and does not replace information you receive from your health care provider. This is only a brief summary of general information. It does NOT include all information about conditions, illnesses, injuries, tests, procedures, treatments, therapies, discharge instructions or life-style choices that may apply to you. You must talk with your health care provider for complete information about your health and treatment options. This information should not be used to decide whether or not to accept your health care providers advice, instructions or recommendations. Only your health care provider has the knowledge and training to provide advice that is right for you.  Copyright   Copyright © 2021 [employer] and its affiliates and/or licensors. All rights reserved.

## 2025-01-08 NOTE — ASSESSMENT & PLAN NOTE
CTA showed mild disease bilaterally.      Followed by Dr. Solo. He is supposed to follow up in October 2025.   Will have f/u US Carotid on 10/30/2025.     Per Dr. Uriostegui's note, he should continue antiplatelet therapy.

## 2025-01-08 NOTE — PROGRESS NOTES
"     Internal Medicine    Antonio Thompson is a 59 y.o. male here today for a Medicare Annual Wellness visit and comprehensive Health Risk Assessment.     Subjective   The following components were reviewed and updated:  Medical history  Family History  Social history  Allergies  Current Medications  Immunizations  Health Maintenance  Patient Care Team    Review of Systems  A comprehensive review of systems was conducted and is negative except as noted above.     Objective   Visit Vitals  BP (!) 150/77 (BP Location: Left arm, Patient Position: Sitting)   Pulse 74   Temp 97.9 °F (36.6 °C)   Resp 20   Ht 5' 4" (1.626 m)   Wt 51.4 kg (113 lb 6.4 oz)   SpO2 98%   BMI 19.47 kg/m²        Physical Exam  Vitals and nursing note reviewed.   Constitutional:       General: He is not in acute distress.     Appearance: He is not ill-appearing.   HENT:      Head: Normocephalic and atraumatic.      Mouth/Throat:      Mouth: Mucous membranes are moist.      Pharynx: Oropharynx is clear.   Eyes:      General: No scleral icterus.     Extraocular Movements: Extraocular movements intact.      Conjunctiva/sclera: Conjunctivae normal.      Pupils: Pupils are equal, round, and reactive to light.   Neck:      Vascular: No carotid bruit.   Cardiovascular:      Rate and Rhythm: Normal rate and regular rhythm.      Heart sounds: No murmur heard.     No friction rub. No gallop.   Pulmonary:      Effort: Pulmonary effort is normal. No respiratory distress.      Breath sounds: Normal breath sounds. No wheezing, rhonchi or rales.   Abdominal:      General: Abdomen is flat. Bowel sounds are normal. There is no distension.      Palpations: Abdomen is soft. There is no mass.      Tenderness: There is no abdominal tenderness.   Musculoskeletal:         General: Normal range of motion.      Cervical back: Normal range of motion and neck supple.   Skin:     General: Skin is warm and dry.   Neurological:      General: No focal deficit present.      Mental " Status: He is alert.   Psychiatric:         Mood and Affect: Mood normal.          Assessment/Plan:  1. Medicare annual wellness visit, initial  -     CBC Auto Differential; Future; Expected date: 01/08/2025  -     Comprehensive Metabolic Panel; Future; Expected date: 01/08/2025  -     Lipid Panel; Future; Expected date: 01/08/2025  -     TSH; Future; Expected date: 01/08/2025  -     Hemoglobin A1C; Future; Expected date: 01/08/2025  -     Urinalysis; Future; Expected date: 01/08/2025  -     PSA, Screening; Future; Expected date: 01/08/2025    2. Poor historian  Assessment & Plan:  Reviewed all notes from care everywhere along with notes from specialist in chart.       3. At risk for knowledge deficit    4. History of non-ST elevation myocardial infarction (NSTEMI)  Assessment & Plan:  Per the patient's discharge summary with Dr. Jones and Dr. Solo's notes, he should be on dual antiplatelet therapy.     Continue Eliquis 5 mg BID + Clopidogrel 75 mg daily.     Stressed to the patient that increased bleeding risk due to dual therapy.   Patient is established with cardiology (Dr. Titus) for long term management of anticoagulation.     Orders:  -     Cancel: Ambulatory referral/consult to Cardiology; Future; Expected date: 01/15/2025  -     ELIQUIS 5 mg Tab; Take 1 tablet (5 mg total) by mouth 2 (two) times daily.  Dispense: 60 tablet; Refill: 2  -     Ambulatory referral/consult to Home Health; Future; Expected date: 01/08/2025    5. History of lacunar cerebrovascular accident (CVA)  Assessment & Plan:  On anticoagulation + high dose statin.     Managed by Neurology- Dr. Potts.     He is scheduled to follow up in clinic February 19th.       Orders:  -     Comprehensive Metabolic Panel; Future; Expected date: 01/08/2025  -     Lipid Panel; Future; Expected date: 01/08/2025  -     Ambulatory referral/consult to Home Health; Future; Expected date: 01/08/2025    6. Stenosis of carotid artery, unspecified  laterality  Assessment & Plan:  CTA showed mild disease bilaterally.      Followed by Dr. Solo. He is supposed to follow up in October 2025.   Will have f/u US Carotid on 10/30/2025.     Per Dr. Uriostegui's note, he should continue antiplatelet therapy.     Orders:  -     Comprehensive Metabolic Panel; Future; Expected date: 01/08/2025  -     Lipid Panel; Future; Expected date: 01/08/2025    7. Primary hypertension  Assessment & Plan:  Increase Amlodipine to 10 mg daily.   Low Sodium Diet (DASH Diet - Less than 2 grams of sodium per day).  Monitor blood pressure daily and log. Report consistent numbers greater than 140/90.  Maintain healthy weight with goal BMI <30. Exercise 30 minutes per day, 5 days per week.  Smoking cessation encouraged to aid in BP reduction.    Orders:  -     cloNIDine (CATAPRES) 0.1 MG tablet; Take 1 tablet (0.1 mg total) by mouth 3 (three) times daily as needed (elevated bp).  Dispense: 60 tablet; Refill: 0  -     amLODIPine (NORVASC) 10 MG tablet; Take 1 tablet (10 mg total) by mouth once daily.  Dispense: 90 tablet; Refill: 0  -     CBC Auto Differential; Future; Expected date: 01/08/2025  -     Comprehensive Metabolic Panel; Future; Expected date: 01/08/2025  -     Lipid Panel; Future; Expected date: 01/08/2025  -     Urinalysis; Future; Expected date: 01/08/2025  -     Ambulatory referral/consult to Home Health; Future; Expected date: 01/08/2025    8. Colon cancer screening  -     Ambulatory referral/consult to Gastroenterology; Future; Expected date: 01/15/2025    9. Screening for HIV (human immunodeficiency virus)  -     HIV 1/2 Ag/Ab (4th Gen); Future; Expected date: 01/08/2025    10. Need for hepatitis C screening test  -     Hepatitis C antibody; Future; Expected date: 01/08/2025    11. Nicotine dependence, cigarettes, uncomplicated  -     Ambulatory referral/consult to Smoking Cessation Program; Future; Expected date: 01/15/2025  -     CT Chest Lung Screening Low Dose; Future;  Expected date: 01/08/2025    12. Screening PSA (prostate specific antigen)  -     PSA, Screening; Future; Expected date: 01/08/2025    13. Elevated fasting blood sugar  -     TSH; Future; Expected date: 01/08/2025  -     Hemoglobin A1C; Future; Expected date: 01/08/2025    14. Abnormal results of thyroid function studies  -     TSH; Future; Expected date: 01/08/2025      A comprehensive HEALTH RISK ASSESSMENT was completed today. Results are summarized below:    There are NO EMOTIONAL/SOCIAL CONCERNS identified on today's screening for Social Isolation, Depression and Anxiety.    There are NO COGNITIVE FUNCTION CONCERNS identified on today's screening.  There are NO FUNCTIONAL OR SAFETY CONCERNS were identified on today's screening for Physical Symptoms, Nutritional, Cognitive Function, Home Safety/Living Situation, Fall Risk, Activities of Daily Living, Independent Activities of Daily Living, Physical Activity, Timed Up and Go test and Whisper test.   The patient reports NO OPIOID PRESCRIPTIONS. This was confirmed through medication reconciliation and the [facility] website.    The patient is A CURRENT TOBACCO USER.  Tobacco Use: High Risk (1/8/2025)    Patient History     Smoking Tobacco Use: Every Day     Smokeless Tobacco Use: Never     Passive Exposure: Not on file           All Questions regarding food, transportation or housing were not answered today.    I provided Antonio Thompson with a 5-10 year written Screening Schedule per USPSTF age appropriate recommendations and a Personal Prevention Plan based on the results of today's Health Risk Assessment. Education, counseling, and referrals were provided as documented above and can be viewed in the After Visit Summary.    Follow up in about 6 weeks (around 2/19/2025) for HTN Follow Up. In addition to this scheduled follow up, the patient has also been instructed to follow up on as needed basis.     Advance Care Planning     Date: 01/09/2025  Patient did not wish or was not  able to name a surrogate decision maker or provide an Advance Care Plan.

## 2025-01-08 NOTE — ASSESSMENT & PLAN NOTE
Per the patient's discharge summary with Dr. Jones and Dr. Solo's notes, he should be on dual antiplatelet therapy.     Continue Eliquis 5 mg BID + Clopidogrel 75 mg daily.     Stressed to the patient that increased bleeding risk due to dual therapy.   Patient is established with cardiology (Dr. Titus) for long term management of anticoagulation.

## 2025-01-09 PROBLEM — Z78.9 POOR HISTORIAN: Status: ACTIVE | Noted: 2025-01-09

## 2025-01-17 ENCOUNTER — HOSPITAL ENCOUNTER (OUTPATIENT)
Dept: RADIOLOGY | Facility: HOSPITAL | Age: 60
Discharge: HOME | End: 2025-01-17
Payer: MEDICARE

## 2025-01-17 DIAGNOSIS — Z87.891 PERSONAL HISTORY OF TOBACCO USE, PRESENTING HAZARDS TO HEALTH: ICD-10-CM

## 2025-01-17 PROCEDURE — 71271 CT THORAX LUNG CANCER SCR C-: CPT | Mod: TC

## 2025-01-27 ENCOUNTER — TELEPHONE (OUTPATIENT)
Dept: FAMILY MEDICINE | Facility: CLINIC | Age: 60
End: 2025-01-27
Payer: MEDICARE

## 2025-01-27 NOTE — TELEPHONE ENCOUNTER
----- Message from Dada Varner NP sent at 1/24/2025  2:51 PM CST -----  Please inform patient of lab results.     1. CT chest shows lungs are consistent with changes from COPD and emphysema.  No large or suspicious nodules that are seen.    2. He has multiple calculi seen in both kidneys.  Encouraged the patient to increase his water intake to help dilute his urine.  If he starts to have any symptoms of kidney stones please follow up in the office.    Thanks for all you do,   Dada

## 2025-01-29 DIAGNOSIS — G47.00 FREQUENT NOCTURNAL AWAKENING: ICD-10-CM

## 2025-01-29 DIAGNOSIS — F51.01 PRIMARY INSOMNIA: Primary | ICD-10-CM

## 2025-02-21 ENCOUNTER — EXTERNAL HOME HEALTH (OUTPATIENT)
Dept: HOME HEALTH SERVICES | Facility: HOSPITAL | Age: 60
End: 2025-02-21
Payer: MEDICARE

## 2025-03-12 ENCOUNTER — TELEPHONE (OUTPATIENT)
Dept: FAMILY MEDICINE | Facility: CLINIC | Age: 60
End: 2025-03-12

## 2025-03-12 ENCOUNTER — OFFICE VISIT (OUTPATIENT)
Dept: FAMILY MEDICINE | Facility: CLINIC | Age: 60
End: 2025-03-12
Payer: MEDICARE

## 2025-03-12 VITALS
BODY MASS INDEX: 18.81 KG/M2 | RESPIRATION RATE: 20 BRPM | SYSTOLIC BLOOD PRESSURE: 135 MMHG | HEART RATE: 60 BPM | TEMPERATURE: 98 F | OXYGEN SATURATION: 99 % | HEIGHT: 64 IN | WEIGHT: 110.19 LBS | DIASTOLIC BLOOD PRESSURE: 77 MMHG

## 2025-03-12 DIAGNOSIS — F17.210 CIGARETTE NICOTINE DEPENDENCE WITHOUT COMPLICATION: ICD-10-CM

## 2025-03-12 DIAGNOSIS — J30.2 SEASONAL ALLERGIES: ICD-10-CM

## 2025-03-12 DIAGNOSIS — Z91.89 AT RISK FOR KNOWLEDGE DEFICIT: ICD-10-CM

## 2025-03-12 DIAGNOSIS — I10 PRIMARY HYPERTENSION: Primary | ICD-10-CM

## 2025-03-12 DIAGNOSIS — H91.91 HEARING LOSS OF RIGHT EAR, UNSPECIFIED HEARING LOSS TYPE: ICD-10-CM

## 2025-03-12 PROCEDURE — 3078F DIAST BP <80 MM HG: CPT | Mod: CPTII,,,

## 2025-03-12 PROCEDURE — 1159F MED LIST DOCD IN RCRD: CPT | Mod: CPTII,,,

## 2025-03-12 PROCEDURE — G2211 COMPLEX E/M VISIT ADD ON: HCPCS | Mod: ,,,

## 2025-03-12 PROCEDURE — 99214 OFFICE O/P EST MOD 30 MIN: CPT | Mod: ,,,

## 2025-03-12 PROCEDURE — 1160F RVW MEDS BY RX/DR IN RCRD: CPT | Mod: CPTII,,,

## 2025-03-12 PROCEDURE — 3008F BODY MASS INDEX DOCD: CPT | Mod: CPTII,,,

## 2025-03-12 PROCEDURE — 3075F SYST BP GE 130 - 139MM HG: CPT | Mod: CPTII,,,

## 2025-03-12 RX ORDER — AMLODIPINE BESYLATE 10 MG/1
10 TABLET ORAL DAILY
Qty: 90 TABLET | Refills: 1 | Status: SHIPPED | OUTPATIENT
Start: 2025-03-12 | End: 2025-09-08

## 2025-03-12 RX ORDER — MONTELUKAST SODIUM 10 MG/1
10 TABLET ORAL NIGHTLY
Qty: 30 TABLET | Refills: 0 | Status: SHIPPED | OUTPATIENT
Start: 2025-03-12 | End: 2025-04-11

## 2025-03-12 NOTE — ASSESSMENT & PLAN NOTE
Inquired about current allergy medication use, finding the patient is not taking any antihistamines.  Assessed that the lack of allergy medication may be contributing to the patient's congestion.  Prescribed Montelukast 10 mg to be taken nightly for allergy symptom management.  Encouraged use of Albuterol inhaler as previously prescribed.

## 2025-03-12 NOTE — ASSESSMENT & PLAN NOTE
Evaluated the patient's right ear, finding it clear with no visible infection.  Noted the patient's reported inability to hear out of the right ear, ongoing since a recent visit to [facility].  Referred the patient to an otolaryngologist for further evaluation of right ear hearing loss and possible audiometry.

## 2025-03-12 NOTE — ASSESSMENT & PLAN NOTE
Monitored the patient's smoking habits, noting a reduction from 1 pack to half a pack of cigarettes per day over the past month.  Auscultated the patient's lungs, noting mild wheezing.  Discussed a gradual smoking reduction strategy to minimize physiological shock.  Instructed the patient to continue efforts to reduce smoking, aiming to decrease by 1 cigarette per month.  Offered additional smoking cessation aids if needed.

## 2025-03-12 NOTE — TELEPHONE ENCOUNTER
Spoke with Baylee at Dr Titus office to schedule the patient an appt due to him not being able to afford his eliquis.  He stated it was over $500 and upset because of the liu. After informing Dada of this she stated it was beyond her to change h is medication he would need to follow up with cardiology. Baylee said she was unable to move his appt up from October and that a nurse would have to call me back to schedule him. I informed the patient of what was said and told him I am waiting for a nurse to call me back from Dr Titus's office to schedule him an appt. I also told him to call himself and try to make his own. Patient verbalized understanding.

## 2025-03-12 NOTE — PROGRESS NOTES
Patient ID: [id number]     Chief Complaint: Hypertension (6 week check up/Has not taken his eliquis in a week states he did not know he had refills/Spoke with Melonie at [facility] and he had refills on file and will fill today)    HPI:     Antonio Thompson is a 59 year old male who presents today for follow up for hypertension.    ENT:  He reports complete hearing loss and congestion in right ear for approximately 2 weeks. He was previously seen at [facility] where he was prescribed antibiotics and ear drops, both of which he completed as directed.    CARDIOVASCULAR:  He checks blood pressure at home in the morning with readings consistently in the 130s. He reports medication non-compliance with Eliquis due to running out and being unaware of available refills, but states he will  the medication today.    SOCIAL HISTORY:  He currently smokes half a pack of cigarettes per day, reduced from one pack approximately one month ago.    PREVENTIVE CARE:  He missed colonoscopy appointment on March 6th due to illness.         Past Medical History:   Diagnosis Date    Cervical spondylosis     Chronic low back pain     COPD (chronic obstructive pulmonary disease)     HTN (hypertension)     Hyperlipidemia     Lacunar infarction 10/01/2024    Migraine     Palpitations         Past Surgical History:   Procedure Laterality Date    MANDIBLE SURGERY  1992        Social History     Socioeconomic History    Marital status:    Tobacco Use    Smoking status: Every Day     Current packs/day: 1.00     Average packs/day: 1 pack/day for 40.2 years (40.2 ttl pk-yrs)     Types: Cigarettes     Start date: 1/8/1985    Smokeless tobacco: Never   Substance and Sexual Activity    Alcohol use: Not Currently    Drug use: Not Currently     Types: Marijuana    Sexual activity: Yes     Social Drivers of Health     Financial Resource Strain: Low Risk  (1/8/2025)    Overall Financial Resource Strain (CARDIA)     Difficulty of Paying Living Expenses:  "Not hard at all   Food Insecurity: No Food Insecurity (10/1/2024)    Received from [facility] and Its Subsidiaries and Affiliates    Hunger Vital Sign     Worried About Running Out of Food in the Last Year: Never true     Ran Out of Food in the Last Year: Never true   Transportation Needs: No Transportation Needs (10/1/2024)    [facility]    PRAPARE - Transportation     Lack of Transportation (Medical): No     Lack of Transportation (Non-Medical): No   Housing Stability: Unknown (10/1/2024)    [facility]    Housing Stability Vital Sign     Unable to Pay for Housing in the Last Year: No     Homeless in the Last Year: No        Current Outpatient Medications   Medication Instructions    albuterol (PROVENTIL/VENTOLIN HFA) 90 mcg/actuation inhaler Inhale into the lungs.    amLODIPine (NORVASC) 10 mg, Oral, Daily, For blood pressure    atorvastatin (LIPITOR) 80 mg, Oral, Nightly    cloNIDine (CATAPRES) 0.1 mg, Oral, 3 times daily PRN    clopidogreL (PLAVIX) 75 mg, Oral, Daily    ELIQUIS 5 mg, Oral, 2 times daily    montelukast (SINGULAIR) 10 mg, Oral, Nightly       Review of patient's allergies indicates:  No Known Allergies     Patient Care Team:  Mathieu Vides DO as PCP - General (Family Medicine)  Adria Titus MD as Consulting Physician (Cardiology)  Roscoe Solo MD as Consulting Physician (Vascular Surgery)  Carlos Reid MD as Consulting Physician (Otolaryngology)     Subjective:     Review of Systems    12 point review of systems conducted, negative except as stated in the history of present illness. See HPI for details.    Objective:     Visit Vitals  /77 (BP Location: Left arm, Patient Position: Sitting)   Pulse 60   Temp 97.9 °F (36.6 °C)   Resp 20   Ht 5' 4" " (1.626 m)   Wt 50 kg (110 lb 3.2 oz)   SpO2 99%   BMI 18.92 kg/m²       Physical Exam  Vitals and nursing note reviewed.   Constitutional:       General: He is not in acute distress.     Appearance: He is not ill-appearing.   HENT:      Head: Normocephalic and atraumatic.      Mouth/Throat:      Mouth: Mucous membranes are moist.      Pharynx: Oropharynx is clear.   Eyes:      General: No scleral icterus.     Extraocular Movements: Extraocular movements intact.      Conjunctiva/sclera: Conjunctivae normal.      Pupils: Pupils are equal, round, and reactive to light.   Neck:      Vascular: No carotid bruit.   Cardiovascular:      Rate and Rhythm: Normal rate and regular rhythm.      Heart sounds: No murmur heard.     No friction rub. No gallop.   Pulmonary:      Effort: Pulmonary effort is normal. No respiratory distress.      Breath sounds: Wheezing present. No rhonchi or rales.   Abdominal:      General: Abdomen is flat. Bowel sounds are normal. There is no distension.      Palpations: Abdomen is soft. There is no mass.      Tenderness: There is no abdominal tenderness.   Musculoskeletal:         General: Normal range of motion.      Cervical back: Normal range of motion and neck supple.   Skin:     General: Skin is warm and dry.   Neurological:      General: No focal deficit present.      Mental Status: He is alert.   Psychiatric:         Mood and Affect: Mood normal.       Labs Reviewed:     Chemistry:  Lab Results   Component Value Date     (L) 10/02/2024    K 3.8 10/02/2024    BUN 12 10/02/2024    CREATININE 0.95 10/02/2024    GLUCOSE 127 04/28/2022    CALCIUM 8.7 (L) 10/02/2024    ALKPHOS 88 03/10/2022    LABPROT 15.0 (H) 11/08/2024    ALBUMIN 3.8 03/10/2022    BILIDIR 0.3 03/10/2022    IBILI 0.40 03/10/2022    AST 12 03/10/2022    ALT 12 03/10/2022    TSH 0.6010 03/10/2022    PSA 0.35 03/10/2022        Lab Results   Component Value Date    HGBA1C 4.9 10/01/2024        Hematology:  Lab Results    Component Value Date    WBC 12.06 (H) 11/08/2024    HGB 15.7 11/08/2024    HCT 46.9 11/08/2024     11/08/2024       Lipid Panel:  Lab Results   Component Value Date    CHOL 164 10/01/2024    HDL 29 (L) 10/01/2024    TRIG 127 10/01/2024        Urine:  Lab Results   Component Value Date    APPEARANCEUA CLEAR 03/10/2022    PROTEINUA Negative 03/10/2022    LEUKOCYTESUR Negative 03/10/2022    RBCUA None Seen 03/10/2022    WBCUA None Seen 03/10/2022    BACTERIA None Seen 03/10/2022     Assessment:       ICD-10-CM ICD-9-CM   1. Primary hypertension  I10 401.9   2. Seasonal allergies  J30.2 477.9   3. Hearing loss of right ear, unspecified hearing loss type  H91.91 389.9   4. Cigarette nicotine dependence without complication  F17.210 305.1   5. At risk for knowledge deficit  Z91.89 V15.89     Plan:     1. Primary hypertension  Assessment & Plan:  Monitored the patient's blood pressure, noting a current reading of 135/77, an improvement from the previous visit's reading in the 140s.  Noted the patient's home blood pressure measurements, typically in the 130s when checked in the morning.  Confirmed the patient denies chest pain, cephalalgia, nausea, emesis, or epistaxis.  Assessed that the increase in medication has been effective in controlling blood pressure.    Instructed the patient to continue Amlodipine 10 mg daily for hypertension management.    Orders:  -     amLODIPine (NORVASC) 10 MG tablet; Take 1 tablet (10 mg total) by mouth once daily. For blood pressure  Dispense: 90 tablet; Refill: 1    2. Seasonal allergies  Assessment & Plan:  Inquired about current allergy medication use, finding the patient is not taking any antihistamines.  Assessed that the lack of allergy medication may be contributing to the patient's congestion.  Prescribed Montelukast 10 mg to be taken nightly for allergy symptom management.  Encouraged use of Albuterol inhaler as previously prescribed.     Orders:  -     montelukast  (SINGULAIR) 10 mg tablet; Take 1 tablet (10 mg total) by mouth every evening.  Dispense: 30 tablet; Refill: 0    3. Hearing loss of right ear, unspecified hearing loss type  Assessment & Plan:  Evaluated the patient's right ear, finding it clear with no visible infection.  Noted the patient's reported inability to hear out of the right ear, ongoing since a recent visit to [facility].  Referred the patient to an otolaryngologist for further evaluation of right ear hearing loss and possible audiometry.    Orders:  -     Ambulatory referral/consult to ENT; Future; Expected date: 03/12/2025  -     Ambulatory referral/consult to Audiology; Future; Expected date: 03/19/2025    4. Cigarette nicotine dependence without complication  Assessment & Plan:  Monitored the patient's smoking habits, noting a reduction from 1 pack to half a pack of cigarettes per day over the past month.  Auscultated the patient's lungs, noting mild wheezing.  Discussed a gradual smoking reduction strategy to minimize physiological shock.  Instructed the patient to continue efforts to reduce smoking, aiming to decrease by 1 cigarette per month.  Offered additional smoking cessation aids if needed.      5. At risk for knowledge deficit  Assessment & Plan:  Noted the patient's report ofnot taking Apixaban due to running out of medication and being unaware of refills.  Educated the patient on the importance of continuing Apixaban without interruption.  Instructed the patient to  the Apixaban refill from the pharmacy today.         FOLLOW-UP:  Antonio to call to reschedule colonoscopy appointment.  Follow up in 6 months.       Follow up in about 6 months (around 9/12/2025) for Follow Up. Print AVS . In addition to their scheduled follow up, the patient has also been instructed to follow up on as needed basis.     This note was generated with the assistance of ambient listening technology. Verbal consent was obtained by the patient and  accompanying visitor(s) for the recording of patient appointment to facilitate this note. I attest to having reviewed and edited the generated note for accuracy, though some syntax or spelling errors may persist. Please contact the author of this note for any clarification.      Dada Varner, Adult-Gerontology NP

## 2025-03-12 NOTE — ASSESSMENT & PLAN NOTE
Noted the patient's report ofnot taking Apixaban due to running out of medication and being unaware of refills.  Educated the patient on the importance of continuing Apixaban without interruption.  Instructed the patient to  the Apixaban refill from the pharmacy today.

## 2025-03-12 NOTE — ASSESSMENT & PLAN NOTE
Monitored the patient's blood pressure, noting a current reading of 135/77, an improvement from the previous visit's reading in the 140s.  Noted the patient's home blood pressure measurements, typically in the 130s when checked in the morning.  Confirmed the patient denies chest pain, cephalalgia, nausea, emesis, or epistaxis.  Assessed that the increase in medication has been effective in controlling blood pressure.    Instructed the patient to continue Amlodipine 10 mg daily for hypertension management.

## 2025-03-12 NOTE — TELEPHONE ENCOUNTER
Lennie with Dr. 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 She will call the patietn to schedule him an appt after letting Dr Titus know what is going on.

## 2025-03-25 NOTE — PROGRESS NOTES
[facility]  Otolaryngology Clinic Note    Antonio Thompson  YOB: 1965    Chief Complaint:   Chief Complaint   Patient presents with    referral: Hearing Loss        HPI: 03/28/2025: 59 y.o. male referred for HL. States he developed a right HL about a month ago. He did have a URI when this began. Completed abx & otic gtts without improvement. Denies tinnitus or dizziness. Denies any previous otologic hx. Has audiogram scheduled for Monday. Endorses frequent nasal congestion & rhinitis which is helped but not resolved with BID use of flonase.     ROS:   10-point review of systems negative except per HPI      Review of patient's allergies indicates:  No Known Allergies    Past Medical History:   Diagnosis Date    Cervical spondylosis     Chronic low back pain     COPD (chronic obstructive pulmonary disease)     HTN (hypertension)     Hyperlipidemia     Lacunar infarction 10/01/2024    Migraine     Palpitations        Past Surgical History:   Procedure Laterality Date    MANDIBLE SURGERY  1992       Social History[1]    Family History   Problem Relation Name Age of Onset    Heart attack Father  54        Cause of death    Coronary artery disease Father      Hypertension Father      Hyperlipidemia Father      Hypertension Brother         Encounter Medications[2]    Physical Exam:  Vitals:    03/28/25 1124   BP: 119/73   BP Location: Right arm   Patient Position: Sitting   Pulse: 74   Temp: 98.1 °F (36.7 °C)   TempSrc: Oral       Physical Exam   General: NAD, voice normal  Neuro: AAO, CN II - XII grossly intact  Head/ Face: NCAT, symmetric, sensations intact bilaterally  Eyes: EOMI, PERRL  Ears: externally normal with grossly normal hearing. Denies hearing [place]. AC > BC b/l  *exam under microscopy  AD: EAC patent, TM intact, no middle ear effusion, no retractions  AS: EAC patent, TM intact, no middle ear effusion, no retractions  Nose: bilateral nares patent, midline septum, no rhinorrhea,  no external deformity, no turbinate hypertrophy  OC/OP: MMM, no intraoral lesions, no trismus, dentition is moderate, no uvular deviation, bilaterally symmetric soft palate elevation, palatoglossus and palatopharyngeal fold wnl; tonsils are symmetric and 1+  Indirect laryngoscopy: deferred due to patient intolerance  Neck: soft, supple, no LAD, normal ROM, no thyromegaly  Respiratory: nonlabored, no wheezing, bilateral chest rise  Cardiovascular: RRR  Gastrointestinal: S NT ND  Skin: warm, no lesions  Musculoskeletal: 5/5 strength  Psych: Appropriate affect/mood     Pertinent Data:  ? LABS:  ? AUDIO:           ? PATH:      Imaging:   I personally reviewed the following images:        Assessment/Plan:  59 y.o. male with concern for sudden right SNHL. Also with AR. Has audio on Monday.  - Flonase + astelin BID  - Will plan to f/u after audio on Monday if pt has asymmetric SNHL    Karyn Lenz NP             [1]   Social History  Socioeconomic History    Marital status:    Tobacco Use    Smoking status: Every Day     Current packs/day: 1.00     Average packs/day: 1 pack/day for 40.2 years (40.2 ttl pk-yrs)     Types: Cigarettes     Start date: 1/8/1985    Smokeless tobacco: Never   Substance and Sexual Activity    Alcohol use: Not Currently    Drug use: Not Currently     Types: Marijuana    Sexual activity: Yes     Social Drivers of Health     Financial Resource Strain: Low Risk  (1/8/2025)    Overall Financial Resource Strain (CARDIA)     Difficulty of Paying Living Expenses: Not hard at all   Food Insecurity: No Food Insecurity (10/1/2024)    Received from [facility] and Its Subsidiaries and Affiliates    Hunger Vital Sign     Worried About Running Out of Food in the Last Year: Never true     Ran Out of Food in the Last Year: Never true   Transportation Needs: No Transportation Needs (10/1/2024)    Received from [facility] and Its  Subsidiaries and Affiliates    PRAPARE - Transportation     Lack of Transportation (Medical): No     Lack of Transportation (Non-Medical): No   Housing Stability: Unknown (10/1/2024)    Received from [facility] and Its Subsidiaries and Affiliates    Housing Stability Vital Sign     Unable to Pay for Housing in the Last Year: No     Homeless in the Last Year: No   [2]   Outpatient Encounter Medications as of 3/28/2025   Medication Sig Dispense Refill    albuterol (PROVENTIL/VENTOLIN HFA) 90 mcg/actuation inhaler Inhale into the lungs.      amLODIPine (NORVASC) 10 MG tablet Take 1 tablet (10 mg total) by mouth once daily. For blood pressure 90 tablet 1    atorvastatin (LIPITOR) 80 MG tablet Take 1 tablet (80 mg total) by mouth every evening. 90 tablet 1    cloNIDine (CATAPRES) 0.1 MG tablet Take 1 tablet (0.1 mg total) by mouth 3 (three) times daily as needed (elevated bp). 60 tablet 0    clopidogreL (PLAVIX) 75 mg tablet Take 1 tablet (75 mg total) by mouth once daily. 90 tablet 1    ELIQUIS 5 mg Tab Take 1 tablet (5 mg total) by mouth 2 (two) times daily. 60 tablet 2    montelukast (SINGULAIR) 10 mg tablet Take 1 tablet (10 mg total) by mouth every evening. 30 tablet 0     No facility-administered encounter medications on file as of 3/28/2025.

## 2025-03-28 ENCOUNTER — OFFICE VISIT (OUTPATIENT)
Dept: OTOLARYNGOLOGY | Facility: CLINIC | Age: 60
End: 2025-03-28
Payer: MEDICARE

## 2025-03-28 VITALS — HEART RATE: 74 BPM | DIASTOLIC BLOOD PRESSURE: 73 MMHG | TEMPERATURE: 98 F | SYSTOLIC BLOOD PRESSURE: 119 MMHG

## 2025-03-28 DIAGNOSIS — J30.9 ALLERGIC RHINITIS, UNSPECIFIED SEASONALITY, UNSPECIFIED TRIGGER: ICD-10-CM

## 2025-03-28 DIAGNOSIS — H91.91 HEARING LOSS OF RIGHT EAR, UNSPECIFIED HEARING LOSS TYPE: Primary | ICD-10-CM

## 2025-03-28 PROCEDURE — 99213 OFFICE O/P EST LOW 20 MIN: CPT | Mod: PBBFAC | Performed by: NURSE PRACTITIONER

## 2025-03-28 RX ORDER — AZELASTINE 1 MG/ML
1 SPRAY, METERED NASAL 2 TIMES DAILY
Qty: 30 ML | Refills: 5 | Status: SHIPPED | OUTPATIENT
Start: 2025-03-28 | End: 2026-03-28

## 2025-03-28 RX ORDER — FLUTICASONE PROPIONATE 50 MCG
2 SPRAY, SUSPENSION (ML) NASAL 2 TIMES DAILY
Qty: 16 G | Refills: 11 | Status: SHIPPED | OUTPATIENT
Start: 2025-03-28 | End: 2025-04-27

## 2025-03-31 ENCOUNTER — CLINICAL SUPPORT (OUTPATIENT)
Dept: AUDIOLOGY | Facility: HOSPITAL | Age: 60
End: 2025-03-31
Payer: MEDICARE

## 2025-03-31 DIAGNOSIS — H91.91 HEARING LOSS OF RIGHT EAR, UNSPECIFIED HEARING LOSS TYPE: ICD-10-CM

## 2025-03-31 DIAGNOSIS — H90.A31 MIXED CONDUCTIVE AND SENSORINEURAL HEARING LOSS OF RIGHT EAR WITH RESTRICTED HEARING OF LEFT EAR: Primary | ICD-10-CM

## 2025-03-31 PROCEDURE — 92567 TYMPANOMETRY: CPT | Performed by: AUDIOLOGIST-HEARING AID FITTER

## 2025-03-31 PROCEDURE — 92557 COMPREHENSIVE HEARING TEST: CPT | Performed by: AUDIOLOGIST-HEARING AID FITTER

## 2025-04-08 DIAGNOSIS — J30.2 SEASONAL ALLERGIES: ICD-10-CM

## 2025-04-08 RX ORDER — MONTELUKAST SODIUM 10 MG/1
10 TABLET ORAL NIGHTLY
Qty: 30 TABLET | Refills: 0 | Status: SHIPPED | OUTPATIENT
Start: 2025-04-08

## 2025-06-06 DIAGNOSIS — I25.2 HISTORY OF NON-ST ELEVATION MYOCARDIAL INFARCTION (NSTEMI): ICD-10-CM

## 2025-06-06 DIAGNOSIS — I63.81 LACUNAR INFARCTION: ICD-10-CM

## 2025-06-06 RX ORDER — CLOPIDOGREL BISULFATE 75 MG/1
75 TABLET ORAL
Qty: 90 TABLET | Refills: 1 | Status: SHIPPED | OUTPATIENT
Start: 2025-06-06

## 2025-06-30 ENCOUNTER — TELEPHONE (OUTPATIENT)
Dept: FAMILY MEDICINE | Facility: CLINIC | Age: 60
End: 2025-06-30
Payer: MEDICARE

## 2025-06-30 DIAGNOSIS — J30.2 SEASONAL ALLERGIES: ICD-10-CM

## 2025-06-30 DIAGNOSIS — I25.2 HISTORY OF NON-ST ELEVATION MYOCARDIAL INFARCTION (NSTEMI): ICD-10-CM

## 2025-06-30 DIAGNOSIS — I10 PRIMARY HYPERTENSION: ICD-10-CM

## 2025-06-30 DIAGNOSIS — I63.81 LACUNAR INFARCTION: ICD-10-CM

## 2025-06-30 RX ORDER — MONTELUKAST SODIUM 10 MG/1
10 TABLET ORAL NIGHTLY
Qty: 30 TABLET | Refills: 0 | Status: SHIPPED | OUTPATIENT
Start: 2025-06-30

## 2025-06-30 RX ORDER — AMLODIPINE BESYLATE 10 MG/1
10 TABLET ORAL DAILY
Qty: 90 TABLET | Refills: 1 | Status: SHIPPED | OUTPATIENT
Start: 2025-06-30 | End: 2025-12-27

## 2025-06-30 RX ORDER — ATORVASTATIN CALCIUM 80 MG/1
80 TABLET, FILM COATED ORAL NIGHTLY
Qty: 90 TABLET | Refills: 1 | Status: SHIPPED | OUTPATIENT
Start: 2025-06-30 | End: 2025-12-27

## 2025-06-30 RX ORDER — CLONIDINE HYDROCHLORIDE 0.1 MG/1
0.1 TABLET ORAL 3 TIMES DAILY PRN
Qty: 60 TABLET | Refills: 0 | Status: SHIPPED | OUTPATIENT
Start: 2025-06-30 | End: 2026-06-30

## 2025-06-30 RX ORDER — ALBUTEROL SULFATE 90 UG/1
1 INHALANT RESPIRATORY (INHALATION) EVERY 6 HOURS PRN
Qty: 18 G | Refills: 2 | Status: SHIPPED | OUTPATIENT
Start: 2025-06-30

## 2025-08-25 DIAGNOSIS — I10 PRIMARY HYPERTENSION: ICD-10-CM

## 2025-08-25 RX ORDER — CLONIDINE HYDROCHLORIDE 0.1 MG/1
TABLET ORAL
Qty: 60 TABLET | Refills: 0 | Status: SHIPPED | OUTPATIENT
Start: 2025-08-25